# Patient Record
Sex: MALE | Race: BLACK OR AFRICAN AMERICAN | NOT HISPANIC OR LATINO | Employment: FULL TIME | URBAN - METROPOLITAN AREA
[De-identification: names, ages, dates, MRNs, and addresses within clinical notes are randomized per-mention and may not be internally consistent; named-entity substitution may affect disease eponyms.]

---

## 2022-12-29 ENCOUNTER — APPOINTMENT (OUTPATIENT)
Dept: GENERAL RADIOLOGY | Facility: CLINIC | Age: 37
End: 2022-12-29
Attending: EMERGENCY MEDICINE
Payer: COMMERCIAL

## 2022-12-29 ENCOUNTER — HOSPITAL ENCOUNTER (EMERGENCY)
Facility: CLINIC | Age: 37
Discharge: HOME | End: 2022-12-29
Attending: PHYSICIAN ASSISTANT | Admitting: PHYSICIAN ASSISTANT
Payer: COMMERCIAL

## 2022-12-29 VITALS
DIASTOLIC BLOOD PRESSURE: 108 MMHG | TEMPERATURE: 96.6 F | HEART RATE: 80 BPM | RESPIRATION RATE: 20 BRPM | OXYGEN SATURATION: 98 % | SYSTOLIC BLOOD PRESSURE: 169 MMHG

## 2022-12-29 DIAGNOSIS — I10 HTN (HYPERTENSION): ICD-10-CM

## 2022-12-29 DIAGNOSIS — M54.9 BACK PAIN: ICD-10-CM

## 2022-12-29 LAB
ALBUMIN UR-MCNC: 20 MG/DL
APPEARANCE UR: CLEAR
BILIRUB UR QL STRIP: NEGATIVE
COLOR UR AUTO: ABNORMAL
GLUCOSE UR STRIP-MCNC: NEGATIVE MG/DL
HGB UR QL STRIP: ABNORMAL
KETONES UR STRIP-MCNC: NEGATIVE MG/DL
LEUKOCYTE ESTERASE UR QL STRIP: NEGATIVE
MUCOUS THREADS #/AREA URNS LPF: PRESENT /LPF
NITRATE UR QL: NEGATIVE
PH UR STRIP: 6.5 [PH] (ref 5–7)
RBC URINE: 7 /HPF
SP GR UR STRIP: 1.02 (ref 1–1.03)
SQUAMOUS EPITHELIAL: <1 /HPF
UROBILINOGEN UR STRIP-MCNC: NORMAL MG/DL
WBC URINE: 1 /HPF

## 2022-12-29 PROCEDURE — 99285 EMERGENCY DEPT VISIT HI MDM: CPT | Mod: 25

## 2022-12-29 PROCEDURE — 72100 X-RAY EXAM L-S SPINE 2/3 VWS: CPT

## 2022-12-29 PROCEDURE — 250N000013 HC RX MED GY IP 250 OP 250 PS 637: Performed by: EMERGENCY MEDICINE

## 2022-12-29 PROCEDURE — 81001 URINALYSIS AUTO W/SCOPE: CPT | Performed by: PHYSICIAN ASSISTANT

## 2022-12-29 PROCEDURE — 71046 X-RAY EXAM CHEST 2 VIEWS: CPT

## 2022-12-29 RX ORDER — CYCLOBENZAPRINE HCL 10 MG
10 TABLET ORAL ONCE
Status: COMPLETED | OUTPATIENT
Start: 2022-12-29 | End: 2022-12-29

## 2022-12-29 RX ORDER — AMLODIPINE BESYLATE 5 MG/1
5 TABLET ORAL DAILY
Status: DISCONTINUED | OUTPATIENT
Start: 2022-12-29 | End: 2022-12-29 | Stop reason: HOSPADM

## 2022-12-29 RX ORDER — AMLODIPINE BESYLATE 5 MG/1
5 TABLET ORAL DAILY
Qty: 30 TABLET | Refills: 0 | Status: SHIPPED | OUTPATIENT
Start: 2022-12-29

## 2022-12-29 RX ORDER — CYCLOBENZAPRINE HCL 10 MG
10 TABLET ORAL 3 TIMES DAILY PRN
Qty: 20 TABLET | Refills: 0 | Status: SHIPPED | OUTPATIENT
Start: 2022-12-29 | End: 2023-01-04

## 2022-12-29 RX ADMIN — AMLODIPINE BESYLATE 5 MG: 5 TABLET ORAL at 18:00

## 2022-12-29 RX ADMIN — CYCLOBENZAPRINE 10 MG: 10 TABLET, FILM COATED ORAL at 18:00

## 2022-12-29 ASSESSMENT — ENCOUNTER SYMPTOMS
WEAKNESS: 1
SHORTNESS OF BREATH: 0
BACK PAIN: 1
NUMBNESS: 0
FREQUENCY: 0
HEMATURIA: 0
DYSURIA: 0

## 2022-12-29 ASSESSMENT — ACTIVITIES OF DAILY LIVING (ADL): ADLS_ACUITY_SCORE: 35

## 2022-12-29 NOTE — ED NOTES
Rapid Assessment Note    History:   Justa Pérez is a 37 year old male who endorses intermittent sharp back pain for past week and high blood pressure. 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 Patient has no PCP, but gets amlodipine from urgent care. Currently does not have amlodipine. No fever, vomiting, or chest pain. No numbness or weakness or tingling in his leg. No cough, fever, or urination issues.    Exam:   General:  Alert, interactive  Cardiovascular:  Well perfused  Lungs:  No respiratory distress, no accessory muscle use  Neuro:  Moving all 4 extremities  Abd-nontender x4.  Msk-minimal paraspinal tenderness palpation at the high lumbar low thoracic area.  No CVA tenderness.  Skin:  Warm, dry  Psych:  Normal affect    Plan of Care:   I evaluated the patient and developed an initial plan of care. I discussed this plan and explained that I, or one of my partners, would be returning to complete the evaluation.     I, Yonas Ballesteros, am serving as a scribe to document services personally performed by Rome Jay MD, based on my observations and the provider's statements to me.    12/29/22   [facility]    Portions of this medical record were completed by a scribe. UPON MY REVIEW AND AUTHENTICATION BY ELECTRONIC SIGNATURE, this confirms (a) I performed the applicable clinical services, and (b) the record is accurate.        Rome Jay MD  12/29/22 0532

## 2022-12-29 NOTE — ED TRIAGE NOTES
PT WENT TO URGENT CARE DUE TO SIDE PAIN  TO HIS LOWER LEFT BACK - WHEN HE WAS SEEN HIS BP WAS ELEVATED - PT STATED HE HAS NOT HAD HIS MEDS FOR A FEW WEEKS.

## 2022-12-30 NOTE — DISCHARGE INSTRUCTIONS
Take your blood pressure medication. You need to find a primary care doctor to help you manage this on a regular basis.  The cause of your back pain is unclear at this time. I do suspect that it is muscular in cause. X-rays are normal. We will trial a course of Flexeril to see if this helps alleviate your discomfort. This is sedating. Do not drive after taking.    Discharge Instructions  Back Pain  You were seen today for back pain. Back pain can have many causes, but most will get better without surgery or other specific treatment. Sometimes there is a herniated ( slipped ) disc. We do not usually do MRI scans to look for these right away, since most herniated discs will get better on their own with time.  Today, we did not find any evidence that your back pain was caused by a serious condition. However, sometimes symptoms develop over time and cannot be found during an emergency visit, so it is very important that you follow up with your primary provider.  Generally, every Emergency Department visit should have a follow-up clinic visit with either a primary or a specialty clinic/provider. Please follow-up as instructed by your emergency provider today.    Return to the Emergency Department if:  You develop a fever with your back pain.   You have weakness or change in sensation in one or both legs.  You lose control of your bowels or bladder, or cannot empty your bladder (cannot pee).  Your pain gets much worse.     Follow-up with your provider:  Unless your pain has completely gone away, please make an appointment with your provider within one week. Most of the routine care for back pain is available in a clinic and not the Emergency Department. You may need further management of your back pain, such as more pain medication, imaging such as an X-ray or MRI, or physical therapy.    What can I do to help myself?  Remain Active -- People are often afraid that they will hurt their back further or delay recovery by  remaining active, but this is one of the best things you can do for your back. In fact, staying in bed for a long time to rest is not recommended. Studies have shown that people with low back pain recover faster when they remain active. Movement helps to bring blood flow to the muscles and relieve muscle spasms as well as preventing loss of muscle strength.  Heat -- Using a heating pad can help with low back pain during the first few weeks. Do not sleep with a heating pad, as you can be burned.   Pain medications - You may take a pain medication such as Tylenol  (acetaminophen), Advil , Motrin  (ibuprofen) or Aleve  (naproxen).  If you were given a prescription for medicine here today, be sure to read all of the information (including the package insert) that comes with your prescription.  This will include important information about the medicine, its side effects, and any warnings that you need to know about.  The pharmacist who fills the prescription can provide more information and answer questions you may have about the medicine.  If you have questions or concerns that the pharmacist cannot address, please call or return to the Emergency Department.   Remember that you can always come back to the Emergency Department if you are not able to see your regular provider in the amount of time listed above, if you get any new symptoms, or if there is anything that worries you.

## 2022-12-30 NOTE — ED PROVIDER NOTES
History   Chief Complaint:  Back Pain    The history is provided by the patient.      Justa Pérez is a 37 year old male with history of hypertension who presents to the ED for evaluation of back pain. Patient reports that he had onset left-sided back pain about 7 days ago. Pain is exacerbated with movements. Denies any numbness or tingling but endorses bilateral hand weakness, although this is not new. Patient was seen at Urgent Care for his back pain and was sent here due to high blood pressure reading. Patient has been prescribed amlodipine but is not currently on it. He travels for work and is never home for more than 2 weeks, resulting in difficulty finding a primary care physician. Denies any chest pain, shortness of breath, incontinence, and urinary symptoms.     Review of Systems   Respiratory: Negative for shortness of breath.    Cardiovascular: Negative for chest pain.   Genitourinary: Negative for dysuria, frequency, hematuria and urgency.   Musculoskeletal: Positive for back pain.   Neurological: Positive for weakness. Negative for numbness.   All other systems reviewed and are negative.    Allergies:  Allergies have not been reviewed    Medications:  The patient is not currently taking any prescribed medications.    Past Medical History:     Hypertension  COVID-19  Asthma  Bronchitis     Past Surgical History:    Appendectomy      Social History:  Presents alone.  Presents via private vehicle.  PCP: Key Ceballos     Physical Exam     Patient Vitals for the past 24 hrs:   BP Temp Pulse Resp SpO2   12/29/22 1841 (!) 169/108 -- 80 20 98 %   12/29/22 1749 (!) 208/119 (!) 96.6  F (35.9  C) 78 18 99 %       Physical Exam  Constitutional: Pleasant. Cooperative.   Eyes: Pupils equally round and reactive  HENT: Head is normal in appearance. Oropharynx is normal with moist mucus membranes.  Cardiovascular: Regular rate and rhythm and without murmurs.  Respiratory: Normal respiratory effort, lungs are  clear bilaterally.  GI: Abdomen is soft, non-tender, non-distended. No guarding, rebound, or rigidity.  Musculoskeletal: No midline spinal tenderness. TTP to left lower back in paraspinal musculature in lumbar region, otherwise no other TTP. 5/5 strength with hip flexion, knee flexion, knee extension, dorsiflexion, and plantarflexion bilaterally. No CVA TTP. 2+ PT pulses.  Skin: Normal, without rash.  Neurologic: Cranial nerves II-XII intact, nl cognition, no focal deficits. Alert and oriented x 3. Normal  strength. Normal leg raise. Sensation to light touch intact throughout all 4 extremities. 5/5 strength with dorsiflexion and plantarflexion bilaterally. No pronator drift. Normal finger nose finger.    Psychiatric: Normal affect.  Nursing notes and vital signs reviewed.    Emergency Department Course     Imaging:  Lumbar spine XR, 2-3 views   Final Result   IMPRESSION:       Nomenclature is based on five lumbar-type vertebral bodies.      Vertebral body heights are unremarkable.      Mild-to-moderate L4-L5 intervertebral disc height loss is compatible with underlying spondylosis. The remaining vertebral disc spaces of the lumbar spine appear preserved.      Pedicles appear symmetric.      The imaged portion of the sacrum appears grossly intact. However, it is partially obscured by stool and bowel gas.      XR Chest 2 Views   Final Result   IMPRESSION: No focal airspace consolidation. No pleural effusion or pneumothorax.      Upper limits of normal heart size.        Report per radiology    Emergency Department Course:     Reviewed:  I reviewed nursing notes, vitals, past medical history and Care Everywhere    Assessments:  1902 I obtained history and examined the patient as noted above.    I rechecked the patient and explained findings.     Interventions:  Medications   amLODIPine (NORVASC) tablet 5 mg (5 mg Oral Given 12/29/22 1800)   cyclobenzaprine (FLEXERIL) tablet 10 mg (10 mg Oral Given 12/29/22 1800)      Disposition:  The patient was discharged to home.     Impression & Plan   Medical Decision Making:  Justa Pérez is a 37 year old male who presents to the ED for evaluation of back pain. He was at urgent care but was noted to be hypertensive and thus was sent to the ED for further evaluation. See HPI as above for additional details. Vitals and physical exam as above. DDx was broad and included cauda equina, fracture, strain, disc pathology, PTX, rib fracture, pyelo, stone, shingles, AAA, amongst others. XRs negative for acute bony or intrathoracic abnormality. No red flag signs or symptoms to suggest for cauda equina. No urinary symptoms and reassuring UA without suggestion for pyelo or stone. No rash to suggest for shingles. Suspect muscular vs disc pathology. Lower suspicion for AAA given history and physical exam, normal symmetric PT pulses, no CP or abdominal pain. Will provide flexeril for home. Regarding BPs, patient normally on amlodipine however has been off for last month. Will refill Amlodipine and advised he f/u closely with PCP regarding this. Discussed reasons to return. All questions answered. Patient discharged to home in stable condition.    Diagnosis:    ICD-10-CM    1. Back pain  M54.9       2. HTN (hypertension)  I10           Discharge Medications:  New Prescriptions    AMLODIPINE (NORVASC) 5 MG TABLET    Take 1 tablet (5 mg) by mouth daily    CYCLOBENZAPRINE (FLEXERIL) 10 MG TABLET    Take 1 tablet (10 mg) by mouth 3 times daily as needed for muscle spasms       Scribe Disclosure:  Janel HATHAWAY, am serving as a scribe at 6:33 PM on 12/29/2022 to document services personally performed by Ra Sheldon PA-C based on my observations and the provider's statements to me.     This record was created at least in part using electronic voice recognition software, so please excuse any typographical errors.         Ra Sheldon PA-C  12/29/22 2014